# Patient Record
Sex: MALE | NOT HISPANIC OR LATINO | Employment: PART TIME | URBAN - METROPOLITAN AREA
[De-identification: names, ages, dates, MRNs, and addresses within clinical notes are randomized per-mention and may not be internally consistent; named-entity substitution may affect disease eponyms.]

---

## 2017-12-06 ENCOUNTER — OUTSIDE FACILITY SERVICE (OUTPATIENT)
Dept: GASTROENTEROLOGY | Facility: CLINIC | Age: 67
End: 2017-12-06

## 2017-12-06 ENCOUNTER — LAB REQUISITION (OUTPATIENT)
Dept: LAB | Facility: HOSPITAL | Age: 67
End: 2017-12-06

## 2017-12-06 DIAGNOSIS — D12.2 BENIGN NEOPLASM OF ASCENDING COLON: ICD-10-CM

## 2017-12-06 PROCEDURE — 88300 SURGICAL PATH GROSS: CPT | Performed by: INTERNAL MEDICINE

## 2017-12-06 PROCEDURE — G0500 MOD SEDAT ENDO SERVICE >5YRS: HCPCS | Performed by: INTERNAL MEDICINE

## 2017-12-06 PROCEDURE — 45385 COLONOSCOPY W/LESION REMOVAL: CPT | Performed by: INTERNAL MEDICINE

## 2017-12-07 LAB
CYTO UR: NORMAL
LAB AP CASE REPORT: NORMAL
LAB AP CLINICAL INFORMATION: NORMAL
LAB AP DIAGNOSIS COMMENT: NORMAL
Lab: NORMAL
PATH REPORT.FINAL DX SPEC: NORMAL
PATH REPORT.GROSS SPEC: NORMAL

## 2017-12-11 ENCOUNTER — TELEPHONE (OUTPATIENT)
Dept: GASTROENTEROLOGY | Facility: CLINIC | Age: 67
End: 2017-12-11

## 2017-12-11 NOTE — TELEPHONE ENCOUNTER
----- Message from Dmitry Clark MD sent at 12/8/2017 11:31 AM EST -----  Please call Minh and inform him that the tissue was not collected. No worries the polyp was removed and a follow up colonoscopy is recommended in 5 years.    Dr. Clark

## 2018-04-03 ENCOUNTER — TRANSCRIBE ORDERS (OUTPATIENT)
Dept: PULMONOLOGY | Facility: HOSPITAL | Age: 68
End: 2018-04-03

## 2018-04-03 DIAGNOSIS — G47.33 OBSTRUCTIVE SLEEP APNEA: Primary | ICD-10-CM

## 2018-04-03 DIAGNOSIS — R06.83 SNORING: ICD-10-CM

## 2018-04-09 ENCOUNTER — HOSPITAL ENCOUNTER (OUTPATIENT)
Dept: SLEEP MEDICINE | Facility: HOSPITAL | Age: 68
Discharge: HOME | End: 2018-04-09
Admitting: INTERNAL MEDICINE

## 2018-04-09 VITALS
WEIGHT: 246.6 LBS | BODY MASS INDEX: 32.68 KG/M2 | HEART RATE: 80 BPM | HEIGHT: 73 IN | DIASTOLIC BLOOD PRESSURE: 84 MMHG | OXYGEN SATURATION: 95 % | SYSTOLIC BLOOD PRESSURE: 146 MMHG

## 2018-04-09 DIAGNOSIS — R06.83 SNORING: ICD-10-CM

## 2018-04-09 DIAGNOSIS — G47.33 OBSTRUCTIVE SLEEP APNEA: ICD-10-CM

## 2018-04-09 PROCEDURE — 95806 SLEEP STUDY UNATT&RESP EFFT: CPT

## 2018-04-09 RX ORDER — ACETAMINOPHEN,DIPHENHYDRAMINE HCL 500; 25 MG/1; MG/1
1 TABLET, FILM COATED ORAL NIGHTLY PRN
COMMUNITY
End: 2020-02-05

## 2018-04-09 RX ORDER — ATORVASTATIN CALCIUM 20 MG/1
20 TABLET, FILM COATED ORAL DAILY
COMMUNITY

## 2020-02-05 ENCOUNTER — OFFICE VISIT (OUTPATIENT)
Dept: ORTHOPEDIC SURGERY | Facility: CLINIC | Age: 70
End: 2020-02-05

## 2020-02-05 VITALS — HEIGHT: 73 IN | OXYGEN SATURATION: 99 % | BODY MASS INDEX: 31.81 KG/M2 | HEART RATE: 95 BPM | WEIGHT: 240 LBS

## 2020-02-05 DIAGNOSIS — I87.8 VENOUS STASIS OF BOTH LOWER EXTREMITIES: ICD-10-CM

## 2020-02-05 DIAGNOSIS — M19.071 ARTHRITIS OF RIGHT FOOT: Primary | ICD-10-CM

## 2020-02-05 DIAGNOSIS — G62.9 NEUROPATHY: ICD-10-CM

## 2020-02-05 PROCEDURE — 99204 OFFICE O/P NEW MOD 45 MIN: CPT | Performed by: PHYSICIAN ASSISTANT

## 2020-02-05 RX ORDER — DICLOFENAC SODIUM AND MISOPROSTOL 75; 200 MG/1; UG/1
1 TABLET, DELAYED RELEASE ORAL 2 TIMES DAILY
COMMUNITY

## 2020-02-05 RX ORDER — ALFUZOSIN HYDROCHLORIDE 10 MG/1
10 TABLET, EXTENDED RELEASE ORAL DAILY
COMMUNITY

## 2020-02-05 RX ORDER — OXYBUTYNIN CHLORIDE 10 MG/1
10 TABLET, EXTENDED RELEASE ORAL DAILY
COMMUNITY

## 2020-02-05 NOTE — PATIENT INSTRUCTIONS
"Education    Diabetic Foot Care / Neuropathic Foot Care    I. SKIN    A.  Remove shoes and socks and look at the feet every morning and night.         B. Blisters - clean it with peroxide or alcohol.  DO NOT put a shoe back on until it is healed.  DO NOT WALK ON THE FOOT.  If it is red or looks infected, call your doctor.    C. Callus - sand calluses daily, it works better on dry skin.  A PED EGG is the best tool, or file or a pumice stone (available at a drugstore) using a back and forth motion over the callus.     D. Ingrown nail - soak it in soapy water and call your doctor.     E. Ulcers or sores on the foot - DO NOT PUT A SHOE ON, DO NOT WALK ON THE FOOT, go to the doctor who looks at your feet.     F. Moisturize the feet every night.  An easy method: The foot with a thin layer of Vaseline or Bag [place] (you can find this at [employer], any feed store) and then a sock    II. NAILS    A.  Trim or file your nails straight across and leave them a little long.  If you have thick fungal nails, a nail  tool or dremel tool works well.    B.  If you have an ingrown nail with reddened skin, soak it (as mentioned above and call your doctor).    III. SHOES    A.  Keep your shoes in good repair and wear any special inserts or diabetic shoes as prescribed.    B.  If you have a problem with a special diabetic shoe and/or insert, such as rubbing, go back to where you got the diabetic shoe/insert as soon as possible.    C.  If you are in \"regular\" shoes, make sure they fit.  Shoes with soft, padded soles, rounded toes, and  good support are best.    IV. THINGS NOT TO DO    A.  DO NOT go barefoot    B.  DO NOT soak feet in very hot water.    C.  DO NOT soak feet in anything other than water with soap or Epsom Salts (NO bleach, turpentine, gasoline, etc.).          "

## 2020-02-05 NOTE — PROGRESS NOTES
[facility] Note    Subjective     Patient: Arturo Marks  : 1950    Primary Care Provider: Shaheen Barnett MD    Requesting Provider: As above    Pain of the Right Foot      History    Chief Complaint: Right foot pain    History of Present Illness: This is a very pleasant 69-year-old male presenting to discuss his right foot pain.  He denies trauma or injury.  He complains of pain in the midfoot and into the arch with weightbearing and walking, no rest pain or night pain.  He has known neuropathy  With some burning and sharp pains both at rest and functionally.  He has pain with walking long distances.  He has treated with ibuprofen, activity modification with persisting pain and dysfunction.  He is here today to find out whether his pain is neuopathic or mechanical.    Current Outpatient Medications on File Prior to Visit   Medication Sig Dispense Refill   • alfuzosin (UROXATRAL) 10 MG 24 hr tablet Take 10 mg by mouth Daily.     • atorvastatin (LIPITOR) 20 MG tablet Take 20 mg by mouth Daily.     • diclofenac-miSOPROStol (ARTHROTEC 75) 75-0.2 MG EC tablet Take 1 tablet by mouth 2 (Two) Times a Day.     • FIBER PO Take  by mouth.     • oxybutynin XL (DITROPAN-XL) 10 MG 24 hr tablet Take 10 mg by mouth Daily.       No current facility-administered medications on file prior to visit.       No Known Allergies   Past Medical History:   Diagnosis Date   • Osteoarthritis      Past Surgical History:   Procedure Laterality Date   • BACK SURGERY      fusion of 4,5 and 6    • KNEE SURGERY Bilateral     total knee replacements    • STAPEDES SURGERY       History reviewed. No pertinent family history.   Social History     Socioeconomic History   • Marital status:      Spouse name: Not on file   • Number of children: Not on file   • Years of education: Not on file   • Highest education level: Not on file   Tobacco Use   • Smoking status: Never Smoker   • Smokeless tobacco: Never  "Used   Substance and Sexual Activity   • Alcohol use: Yes     Alcohol/week: 1.0 standard drinks     Types: 1 Glasses of wine per week   • Drug use: Never   • Sexual activity: Defer        Review of Systems   Constitutional: Negative.    HENT: Negative.    Eyes: Negative.    Respiratory: Negative.    Cardiovascular: Negative.    Gastrointestinal: Negative.    Endocrine: Negative.    Genitourinary: Negative.    Musculoskeletal: Positive for arthralgias.   Skin: Negative.    Allergic/Immunologic: Negative.    Neurological: Negative.    Hematological: Negative.    Psychiatric/Behavioral: Negative.        The following portions of the patient's history were reviewed and updated as appropriate: allergies, current medications, past family history, past medical history, past social history, past surgical history and problem list.      Objective      Physical Exam  Pulse 95   Ht 184.2 cm (72.52\")   Wt 109 kg (240 lb)   SpO2 99%   BMI 32.08 kg/m²     Body mass index is 32.08 kg/m².    GENERAL: Body habitus: obese    Lower extremity edema: Left: 1+ pitting; Right: 1+ pitting    Varicose veins:  Left: moderate; Right: moderate    Gait: normal     Mental Status:  awake and alert; oriented to person, place, and time    Voice:  clear  SKIN:  Normal    Hair Growth:  Right:normal; Left:  normal  HEENT: Head: Normocephalic, atraumatic,  without obvious abnormality.  eye: normal external eye, no icterus   PULM:  Repiratory effort normal    Ortho Exam  V:  Dorsalis Pedis:  Right: 2+; Left:2+    Posterior Tibial: Right:2+; Left:2+    Capillary Refill:  Brisk  MSK:        Tibia:  Right:  tender over subcutaneous border; Left:  tender over subcutaneous border      Ankle:  Right: non tender, ROM  normal and motor function  normal; Left:  non tender, ROM  normal and motor function  normal      Foot:  Right:  tender Over the midfoot; Left:  non tender      NEURO: Heel Walking:  Right:  normal; Left:  normal    Toe Walking:  Right:  " normal; Left:  normal     [place]-Duglas 5.07 monofilament test: normal    Lower extremity sensation: intact     Calf Atrophy:none    Motor Function: all 5/5          Medical Decision Making    Data Review:   ordered and reviewed x-rays today    Assessment:  1. Arthritis of right foot    2. Venous stasis of both lower extremities    3. Neuropathy        Plan:  Right foot arthritis with neuropathy and venous stasis.  I reviewed today's x-rays and clinical findings with the patient.  X-rays today show severe narrowing and erosive changes in the tarsometatarsal joints 1, 2, 3 as well as the naviculocuneiform joints, no acute fractures, no comparison.  I explained to the patient that his pain and function limitations are secondary to both neuropathy as well as arthritis in his foot.  We do skin mechanical part is the arthritis that is treated with custom orthotics as well is topical anti-inflammatories.  He is already taking oral anti-inflammatories.  If it continues to bother him him to consider a steroid injection.  The rest pain and burning pain he has is from neuropathy.  He should talk to Dr. Velazco regarding medication such as gabapentin and Lyrica.  I would also recommend he get knee-high compression stockings for his venous stasis and edema.  I have given her prescription for orthotics as well as Voltaren gel.  He will return to see us as needed.    Patient history, diagnosis and treatment plan discussed with Dr. Saxena.        Wanda Garvin PA-C  02/07/20  12:26 PM

## 2020-07-21 ENCOUNTER — APPOINTMENT (OUTPATIENT)
Dept: PREADMISSION TESTING | Facility: HOSPITAL | Age: 70
End: 2020-07-21

## 2022-08-22 ENCOUNTER — TRANSCRIBE ORDERS (OUTPATIENT)
Dept: ADMINISTRATIVE | Facility: HOSPITAL | Age: 72
End: 2022-08-22

## 2022-08-22 DIAGNOSIS — M43.10 ACQUIRED SPONDYLOLISTHESIS: Primary | ICD-10-CM

## 2023-01-24 DIAGNOSIS — Z12.11 SCREENING FOR COLORECTAL CANCER: Primary | ICD-10-CM

## 2023-01-24 DIAGNOSIS — Z12.12 SCREENING FOR COLORECTAL CANCER: Primary | ICD-10-CM

## 2023-02-16 ENCOUNTER — OUTSIDE FACILITY SERVICE (OUTPATIENT)
Dept: GASTROENTEROLOGY | Facility: CLINIC | Age: 73
End: 2023-02-16
Payer: MEDICARE

## 2023-02-16 PROCEDURE — G0105 COLORECTAL SCRN; HI RISK IND: HCPCS | Performed by: INTERNAL MEDICINE

## 2023-02-16 PROCEDURE — G0500 MOD SEDAT ENDO SERVICE >5YRS: HCPCS | Performed by: INTERNAL MEDICINE

## 2025-05-29 ENCOUNTER — PATIENT ROUNDING (BHMG ONLY) (OUTPATIENT)
Dept: URGENT CARE | Facility: CLINIC | Age: 75
End: 2025-05-29
Payer: MEDICARE